# Patient Record
Sex: MALE | Race: BLACK OR AFRICAN AMERICAN | NOT HISPANIC OR LATINO | URBAN - METROPOLITAN AREA
[De-identification: names, ages, dates, MRNs, and addresses within clinical notes are randomized per-mention and may not be internally consistent; named-entity substitution may affect disease eponyms.]

---

## 2017-06-07 ENCOUNTER — EMERGENCY (EMERGENCY)
Facility: HOSPITAL | Age: 42
LOS: 0 days | Discharge: HOME | End: 2017-06-07
Attending: EMERGENCY MEDICINE
Payer: OTHER MISCELLANEOUS

## 2017-06-07 VITALS
TEMPERATURE: 98 F | RESPIRATION RATE: 20 BRPM | WEIGHT: 195.11 LBS | DIASTOLIC BLOOD PRESSURE: 91 MMHG | OXYGEN SATURATION: 98 % | SYSTOLIC BLOOD PRESSURE: 130 MMHG | HEART RATE: 59 BPM

## 2017-06-07 PROCEDURE — 99284 EMERGENCY DEPT VISIT MOD MDM: CPT

## 2017-06-07 RX ORDER — CYCLOBENZAPRINE HYDROCHLORIDE 10 MG/1
1 TABLET, FILM COATED ORAL
Qty: 15 | Refills: 0 | OUTPATIENT
Start: 2017-06-07 | End: 2017-06-12

## 2017-06-07 RX ORDER — KETOROLAC TROMETHAMINE 30 MG/ML
60 SYRINGE (ML) INJECTION ONCE
Qty: 0 | Refills: 0 | Status: DISCONTINUED | OUTPATIENT
Start: 2017-06-07 | End: 2017-06-07

## 2017-06-07 RX ORDER — CYCLOBENZAPRINE HYDROCHLORIDE 10 MG/1
10 TABLET, FILM COATED ORAL ONCE
Qty: 0 | Refills: 0 | Status: COMPLETED | OUTPATIENT
Start: 2017-06-07 | End: 2017-06-07

## 2017-06-07 RX ADMIN — Medication 60 MILLIGRAM(S): at 11:44

## 2017-06-07 RX ADMIN — Medication 60 MILLIGRAM(S): at 12:43

## 2017-06-07 RX ADMIN — CYCLOBENZAPRINE HYDROCHLORIDE 10 MILLIGRAM(S): 10 TABLET, FILM COATED ORAL at 11:44

## 2017-06-07 NOTE — ED ADULT NURSE NOTE - OBJECTIVE STATEMENT
40 yo male BIBA c/o back pain, "This morning I felt a tweek in my back when I was lifting something heavy at work, then I was taking a break, went to get up and my back was in pain." pt ambulates c no assistance needed. denies pmh back pain.

## 2017-06-07 NOTE — ED PROVIDER NOTE - PHYSICAL EXAMINATION
Gen: Alert, Well appearing. NAD    Head: NC, AT, PERRL, EOMI, normal lids/conjunctiva   ENT: Bilateral TM WNL, normal hearing, patent oropharynx without erythema/exudate, uvula midline  Neck: supple, no tenderness/meningismus/JVD   Pulm: Bilateral clear BS, normal resp effort, no wheeze/stridor/retractions  CV: RRR, no M/R/G, +dist pulses   Abd: soft, NT/ND, +BS, no guarding/rebound tenderness  Mskel: no edema/erythema/cyanosis. no midline tenderness. no step offs. full power LE and UE.   Skin: no rash   Neuro: AAOx3, no sensory/motor deficits, CN 2-12 intact

## 2017-06-07 NOTE — ED PROVIDER NOTE - MEDICAL DECISION MAKING DETAILS
pain improved. dc with naprosyn, flexeril. Discussed results and outcome of testing with the patient.  Patient given copy of available results. Patient advised to please follow up with their PMD within the next 24 hours and return to the Emergency Department for worsening symptoms or any other concerns.

## 2017-06-07 NOTE — ED PROVIDER NOTE - OBJECTIVE STATEMENT
40yo male with no pertinent pmh presents with mid back pain noted only upon moving since this am after attempting to lift something heavy at work. Pain progressed throughout day. No analgesia taken. No trauma.     ROS: No fever/chills. No photophobia/eye pain/changes in vision, No ear pain/sore throat/dysphagia, No chest pain/palpitations. No SOB/cough/stridor. No abdominal pain, N/V/D, no black/bloody bm. No dysuria/frequency/discharge, No headache. No Dizziness.  No rash.  No numbness/tingling/weakness.

## 2017-06-08 DIAGNOSIS — M54.9 DORSALGIA, UNSPECIFIED: ICD-10-CM
